# Patient Record
Sex: MALE | Race: WHITE | NOT HISPANIC OR LATINO
[De-identification: names, ages, dates, MRNs, and addresses within clinical notes are randomized per-mention and may not be internally consistent; named-entity substitution may affect disease eponyms.]

---

## 2019-07-31 ENCOUNTER — APPOINTMENT (OUTPATIENT)
Dept: POPULATION HEALTH | Facility: CLINIC | Age: 24
End: 2019-07-31
Payer: COMMERCIAL

## 2019-07-31 PROBLEM — Z00.00 ENCOUNTER FOR PREVENTIVE HEALTH EXAMINATION: Status: ACTIVE | Noted: 2019-07-31

## 2019-07-31 PROCEDURE — 99211 OFF/OP EST MAY X REQ PHY/QHP: CPT

## 2020-03-18 ENCOUNTER — EMERGENCY (EMERGENCY)
Facility: HOSPITAL | Age: 25
LOS: 1 days | Discharge: HOME | End: 2020-03-18
Attending: EMERGENCY MEDICINE
Payer: COMMERCIAL

## 2020-03-18 VITALS
HEART RATE: 78 BPM | DIASTOLIC BLOOD PRESSURE: 78 MMHG | OXYGEN SATURATION: 100 % | SYSTOLIC BLOOD PRESSURE: 122 MMHG | RESPIRATION RATE: 20 BRPM

## 2020-03-18 VITALS
DIASTOLIC BLOOD PRESSURE: 69 MMHG | TEMPERATURE: 97 F | HEIGHT: 72 IN | OXYGEN SATURATION: 100 % | SYSTOLIC BLOOD PRESSURE: 118 MMHG | RESPIRATION RATE: 16 BRPM | HEART RATE: 84 BPM | WEIGHT: 149.91 LBS

## 2020-03-18 PROCEDURE — 99283 EMERGENCY DEPT VISIT LOW MDM: CPT

## 2020-03-18 PROCEDURE — 73130 X-RAY EXAM OF HAND: CPT | Mod: 26,RT

## 2020-03-18 PROCEDURE — 90471 IMMUNIZATION ADMIN: CPT

## 2020-03-18 PROCEDURE — 96374 THER/PROPH/DIAG INJ IV PUSH: CPT

## 2020-03-18 PROCEDURE — 12001 RPR S/N/AX/GEN/TRNK 2.5CM/<: CPT

## 2020-03-18 PROCEDURE — 90715 TDAP VACCINE 7 YRS/> IM: CPT

## 2020-03-18 PROCEDURE — 73130 X-RAY EXAM OF HAND: CPT

## 2020-03-18 PROCEDURE — 99283 EMERGENCY DEPT VISIT LOW MDM: CPT | Mod: 25

## 2020-03-18 RX ORDER — CEFAZOLIN SODIUM 1 G
1000 VIAL (EA) INJECTION ONCE
Refills: 0 | Status: COMPLETED | OUTPATIENT
Start: 2020-03-18 | End: 2020-03-18

## 2020-03-18 RX ORDER — MORPHINE SULFATE 50 MG/1
4 CAPSULE, EXTENDED RELEASE ORAL ONCE
Refills: 0 | Status: DISCONTINUED | OUTPATIENT
Start: 2020-03-18 | End: 2020-03-18

## 2020-03-18 RX ORDER — TETANUS TOXOID, REDUCED DIPHTHERIA TOXOID AND ACELLULAR PERTUSSIS VACCINE, ADSORBED 5; 2.5; 8; 8; 2.5 [IU]/.5ML; [IU]/.5ML; UG/.5ML; UG/.5ML; UG/.5ML
0.5 SUSPENSION INTRAMUSCULAR ONCE
Refills: 0 | Status: COMPLETED | OUTPATIENT
Start: 2020-03-18 | End: 2020-03-18

## 2020-03-18 RX ADMIN — MORPHINE SULFATE 4 MILLIGRAM(S): 50 CAPSULE, EXTENDED RELEASE ORAL at 18:12

## 2020-03-18 RX ADMIN — TETANUS TOXOID, REDUCED DIPHTHERIA TOXOID AND ACELLULAR PERTUSSIS VACCINE, ADSORBED 0.5 MILLILITER(S): 5; 2.5; 8; 8; 2.5 SUSPENSION INTRAMUSCULAR at 18:12

## 2020-03-18 RX ADMIN — Medication 100 MILLIGRAM(S): at 18:13

## 2020-03-18 NOTE — ED ADULT NURSE REASSESSMENT NOTE - NS ED NURSE REASSESS COMMENT FT1
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  pt's hand was injected with lidocaine by PA prior to hand washing.

## 2020-03-18 NOTE — ED ADULT NURSE NOTE - OBJECTIVE STATEMENT
25 y/o male received aox3 ambulatory c/o right hand finger pain, pt is a  and sustained an machine related injury. right hand 3rd and 4th digits noted avultions, tendons visible, dry blood noted to area. pt retains rom and sensation to both digits. no other injuries noted.

## 2020-03-18 NOTE — ED PROVIDER NOTE - SKIN, MLM
multiple abrasions to 2nd right digit. superficial abrasions to right middle digit. small 0.5 cm lac to volar aspect of right middle digit with flexor tendon exposed. tendon visualized through full ROM and no evidence of tendon injury. small 1 cm lac to dorsal aspect of right middle digit just above MTP joint. superficial abrasions to right 5th digit and skin flap with necrotic skin on the flap

## 2020-03-18 NOTE — ED PROVIDER NOTE - CONSTITUTIONAL, MLM
Patient scheduled for insulin education for Monday 4/24/17 at 9:30am. Patient informed and verbalized understanding. normal... Well appearing, awake, alert, oriented to person, place, time/situation and in no apparent distress.

## 2020-03-18 NOTE — ED PROVIDER NOTE - ATTENDING CONTRIBUTION TO CARE
Scribe AS for Dr. Velasco: 25 y/o male with no relevant PMHx presents to the ED c/o right handed injury x today. Pt states he got his right hand stuck in a pulley while at work. Happened 20 minutes PTA. Pt is right handed. Now presents with multiple lacerations on his right fingers. No other injuries. Reports pain to hand, denies numbness or tingling to hand.  PE: +right hand has multiple avulsion injuries. intact sensation to first digit with no acute injury (reports prior fracture to finger many months ago). multiple superficial skin abrasions to second digit dorsal aspect with full ROM and intact sensation. exposed flexor tendon to the third digit, yost aspect, laceration to the dorsal aspect just above the MCP joint sensation intact FROM, superficial abrasions to the fourth digit with skin flap noted on the distal palmar aspect, skin tissue appears to be already necrosed, sensation intact, full ROM, no significant trauma to the fifth digit with intact sensation FROM.  Pt with FROM of all digits of right hand and intact sensation with cap refill less then 2 seconds +radial pulse.  No injury to left hand.  Pt with complex hand injury.  Will begin abx, update Tetanus, x-ray hand and consult hand surgery.

## 2020-03-18 NOTE — ED PROVIDER NOTE - CLINICAL SUMMARY MEDICAL DECISION MAKING FREE TEXT BOX
multiple abrasions and lacs to right hand after work injury. flexor tendon exposed but no obvious tendon injury or deficits. will consult with hand specialist due to tendon exposure. will x-ray to eval for bony involvement. clean, repair, abx, update tetanus

## 2020-03-18 NOTE — ED PROVIDER NOTE - NEUROLOGICAL, MLM
Alert and oriented, no focal deficits, no motor or sensory deficits. cap refill < 2 sec. good 2 point discrim

## 2020-03-18 NOTE — ED ADULT NURSE NOTE - NSIMPLEMENTINTERV_GEN_ALL_ED
Implemented All Universal Safety Interventions:  [place] to call system. Call bell, personal items and telephone within reach. Instruct patient to call for assistance. Room bathroom lighting operational. Non-slip footwear when patient is off stretcher. Physically safe environment: no spills, clutter or unnecessary equipment. Stretcher in lowest position, wheels locked, appropriate side rails in place.

## 2020-03-18 NOTE — ED PROVIDER NOTE - PROVIDER TOKENS
PROVIDER:[TOKEN:[[id number]],FOLLOWUP:[1-3 Days]],PROVIDER:[TOKEN:[[id number]],FOLLOWUP:[1-3 Days]]

## 2020-03-18 NOTE — ED PROVIDER NOTE - NSFOLLOWUPINSTRUCTIONS_ED_ALL_ED_FT
take antibiotics as prescribed   tylenol or motrin for pain  apply bacitracin 1-2 times a day to abrasions  off work until cleared by hand specialist   referral to local hand specialists provided, may follow up with hand specialist in [place] when you return back home  suture removal in 8-10 days, may return to ED or follow up with primary care provider or hand specialist       Finger Laceration    WHAT YOU NEED TO KNOW:    A finger laceration is a deep cut in your skin. Your blood vessels, bones, joints, tendons, or nerves may also be injured.    DISCHARGE INSTRUCTIONS:    Return to the emergency department if:     Your wound comes apart.      Blood soaks through your bandage.      You have severe pain in your finger or hand.      Your finger is pale and cold.      You have sudden trouble moving your finger.      Your swelling suddenly gets worse.      You have red streaks on your skin coming from your wound.    Call your doctor or hand specialist if:     You have new numbness or tingling.      Your finger feels warm, looks swollen or red, and is draining pus.      You have a fever.      You have questions or concerns about your condition or care.    Medicines: You may need any of the following:     Antibiotics help prevent a bacterial infection.       Acetaminophen decreases pain and fever. It is available without a doctor's order. Ask how much to take and how often to take it. Follow directions. Read the labels of all other medicines you are using to see if they also contain acetaminophen, or ask your doctor or pharmacist. Acetaminophen can cause liver damage if not taken correctly. Do not use more than 4 grams (4,000 milligrams) total of acetaminophen in one day.       Prescription pain medicine may be given. Ask your healthcare provider how to take this medicine safely. Some prescription pain medicines contain acetaminophen. Do not take other medicines that contain acetaminophen without talking to your healthcare provider. Too much acetaminophen may cause liver damage. Prescription pain medicine may cause constipation. Ask your healthcare provider how to prevent or treat constipation.       Take your medicine as directed. Contact your healthcare provider if you think your medicine is not helping or if you have side effects. Tell him or her if you are allergic to any medicine. Keep a list of the medicines, vitamins, and herbs you take. Include the amounts, and when and why you take them. Bring the list or the pill bottles to follow-up visits. Carry your medicine list with you in case of an emergency.    Self-care:     Apply ice on your finger for 15 to 20 minutes every hour or as directed. Use an ice pack, or put crushed ice in a plastic bag. Cover it with a towel before you apply it to your skin. Ice helps prevent tissue damage and decreases swelling and pain.      Elevate your hand above the level of your heart as often as you can. This will help decrease swelling and pain. Prop your hand on pillows or blankets to keep it elevated comfortably.      Wear your splint as directed. A splint will decrease movement and stress on your wound. The splint may help your wound heal faster. Ask your healthcare provider how to apply and remove a splint.      Apply ointments to decrease scarring. Do not apply ointments until your healthcare provider says it is okay. You may need to wait until your wound is healed. Ask which ointment to buy and how often to use it.    Wound care:     Do not get your wound wet until your healthcare provider says it is okay. Do not soak your hand in water. Do not go swimming until your healthcare provider says it is okay. When your healthcare provider says it is okay, carefully wash around the wound with soap and water. Let soap and water run over your wound. Gently pat the area dry or allow it to air dry.      Change your bandages when they get wet, dirty, or after washing. Apply new, clean bandages as directed. Do not apply elastic bandages or tape too tightly. Do not put powders or lotions on your wound.      Apply antibiotic ointment as directed. Your healthcare provider may give you antibiotic ointment to put over your wound if you have stitches. If you have Strips-Strips™ over your wound, let them dry up and fall off on their own. If they do not fall off within 14 days, gently remove them. If you have glue over your wound, do not remove or pick at it. If your glue comes off, do not replace it with glue that you have at home.      Check your wound every day for signs of infection. Signs of infection include swelling, redness, or pus.    Follow up with your doctor or hand specialist in 2 days: Write down your questions so you remember to ask them during your visits.

## 2020-03-18 NOTE — ED PROVIDER NOTE - PATIENT PORTAL LINK FT
You can access the FollowMyHealth Patient Portal offered by [facility] by registering at the following website: [email or web address]. By joining "[employer]’s FollowMyHealth portal, you will also be able to view your health information using other applications (apps) compatible with our system.

## 2020-03-18 NOTE — ED PROVIDER NOTE - PROGRESS NOTE DETAILS
Scribe AS for Dr. Velasco: 23 y/o male with no relevant PMHx presents to the ED c/o right handed injury x today. Pt states he got his right hand stuck in a pulley while at work. Happened 20 minutes PTA. Pt is right handed. Now presents with multiple lacerations on his right fingers. No other injuries. PE: +right hand has multiple avulsion injuries. intact sensation to first digit. multiple superficial skin abrasions to second digit with full ROM and intact sensation. exposed flexor tendon to the third digit, yost aspect, laceration to the volar aspect just above the MCP joint, superficial abrasions to the fourth digit with skin flap noted on the distal palmar aspect, skin tissue appears to be already necrosed, sensation intact, full ROM, no significant trauma to the fifth digit with intact sensation. spoke with Dr. Sesay (on call for hands). due to no lack of function, states appropriate for ED provider to close the lacerations. will be available to see patient in the office this week (Friday) Reevaluated patient at bedside.  Patient feeling much improved. lac repaired, tetanus updated. IV abx infused.  Discussed the results of all diagnostic testing in ED and copies of all reports given. no thumb pain or tenderness. reports previous injury to thumb a year ago. denies any injury to thumb today. do not suspect fx is acute on x-ray. patient lives in [place], plans on going home next 1-2 days. recommend to follow up with hand specialist. referral to local hand specialist provided. recommend off work until cleared by hand specialist.   An opportunity to ask questions was given.  Discussed the importance of prompt, close medical follow-up.  Patient will return with any changes, concerns or persistent / worsening symptoms.  Understanding of all instructions verbalized.

## 2021-12-09 NOTE — ED PROVIDER NOTE - CARE PROVIDER_API CALL
Marin Parrish (DO)  Plastic Surgery  [address] [place] [address]  Phone: [phone]  Fax: [phone]  Follow Up Time: 1-3 Days    Luis Junior (MD)  Orthopaedic Surgery  [address] [place] [address]  Phone: [phone]  Fax: [phone]  Follow Up Time: 1-3 Days Topical Sulfur Applications Counseling: Topical Sulfur Counseling: Patient counseled that this medication may cause skin irritation or allergic reactions.  In the event of skin irritation, the patient was advised to reduce the amount of the drug applied or use it less frequently.   The patient verbalized understanding of the proper use and possible adverse effects of topical sulfur application.  All of the patient's questions and concerns were addressed.